# Patient Record
Sex: FEMALE | Race: WHITE | NOT HISPANIC OR LATINO | URBAN - METROPOLITAN AREA
[De-identification: names, ages, dates, MRNs, and addresses within clinical notes are randomized per-mention and may not be internally consistent; named-entity substitution may affect disease eponyms.]

---
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
## 2024-03-19 ENCOUNTER — OFFICE VISIT (OUTPATIENT)
Dept: FAMILY MEDICINE | Facility: CLINIC | Age: 19
End: 2024-03-19
Payer: COMMERCIAL

## 2024-03-19 ENCOUNTER — APPOINTMENT (OUTPATIENT)
Dept: LAB | Age: 19
End: 2024-03-19
Attending: NURSE PRACTITIONER
Payer: COMMERCIAL

## 2024-03-19 VITALS
RESPIRATION RATE: 16 BRPM | TEMPERATURE: 98.3 F | WEIGHT: 109 LBS | DIASTOLIC BLOOD PRESSURE: 60 MMHG | OXYGEN SATURATION: 97 % | BODY MASS INDEX: 20.58 KG/M2 | HEART RATE: 89 BPM | SYSTOLIC BLOOD PRESSURE: 102 MMHG | HEIGHT: 61 IN

## 2024-03-19 DIAGNOSIS — G43.909 MIGRAINE WITHOUT STATUS MIGRAINOSUS, NOT INTRACTABLE, UNSPECIFIED MIGRAINE TYPE: ICD-10-CM

## 2024-03-19 DIAGNOSIS — G43.909 MIGRAINE WITHOUT STATUS MIGRAINOSUS, NOT INTRACTABLE, UNSPECIFIED MIGRAINE TYPE: Primary | ICD-10-CM

## 2024-03-19 LAB
25(OH)D3 SERPL-MCNC: 23 NG/ML (ref 30–100)
ALBUMIN SERPL-MCNC: 4.1 G/DL (ref 3.5–5.7)
ALP SERPL-CCNC: 44 IU/L (ref 34–125)
ALT SERPL-CCNC: 15 IU/L (ref 7–52)
ANION GAP SERPL CALC-SCNC: 7 MEQ/L (ref 3–15)
AST SERPL-CCNC: 17 IU/L (ref 13–39)
BASOPHILS # BLD: 0.04 K/UL (ref 0.01–0.1)
BASOPHILS NFR BLD: 0.6 %
BILIRUB SERPL-MCNC: 0.3 MG/DL (ref 0.3–1.2)
BUN SERPL-MCNC: 8 MG/DL (ref 7–25)
CALCIUM SERPL-MCNC: 9.5 MG/DL (ref 8.6–10.3)
CHLORIDE SERPL-SCNC: 106 MEQ/L (ref 98–107)
CO2 SERPL-SCNC: 27 MEQ/L (ref 21–31)
CREAT SERPL-MCNC: 0.8 MG/DL (ref 0.6–1.2)
DIFFERENTIAL METHOD BLD: NORMAL
EGFRCR SERPLBLD CKD-EPI 2021: >60 ML/MIN/1.73M*2
EOSINOPHIL # BLD: 0.09 K/UL (ref 0.04–0.36)
EOSINOPHIL NFR BLD: 1.4 %
ERYTHROCYTE [DISTWIDTH] IN BLOOD BY AUTOMATED COUNT: 11.8 % (ref 11.7–14.4)
FERRITIN SERPL-MCNC: 24 NG/ML (ref 11–250)
GLUCOSE SERPL-MCNC: 97 MG/DL (ref 70–99)
HCT VFR BLD AUTO: 43.5 % (ref 35–45)
HGB BLD-MCNC: 14.3 G/DL (ref 11.8–15.7)
IMM GRANULOCYTES # BLD AUTO: 0.01 K/UL (ref 0–0.08)
IMM GRANULOCYTES NFR BLD AUTO: 0.2 %
IRON SATN MFR SERPL: 12 % (ref 15–45)
IRON SERPL-MCNC: 53 UG/DL (ref 35–150)
LYMPHOCYTES # BLD: 2.75 K/UL (ref 1.2–3.5)
LYMPHOCYTES NFR BLD: 43.2 %
MCH RBC QN AUTO: 29.7 PG (ref 28–33.2)
MCHC RBC AUTO-ENTMCNC: 32.9 G/DL (ref 32.2–35.5)
MCV RBC AUTO: 90.4 FL (ref 83–98)
MONOCYTES # BLD: 0.71 K/UL (ref 0.28–0.8)
MONOCYTES NFR BLD: 11.2 %
NEUTROPHILS # BLD: 2.76 K/UL (ref 1.7–7)
NEUTS SEG NFR BLD: 43.4 %
NRBC BLD-RTO: 0 %
PDW BLD AUTO: 10.9 FL (ref 9.4–12.3)
PLATELET # BLD AUTO: 264 K/UL (ref 150–369)
POTASSIUM SERPL-SCNC: 4.5 MEQ/L (ref 3.5–5.1)
PROT SERPL-MCNC: 6.7 G/DL (ref 6–8.2)
RBC # BLD AUTO: 4.81 M/UL (ref 3.93–5.22)
SODIUM SERPL-SCNC: 140 MEQ/L (ref 136–145)
TIBC SERPL-MCNC: 454 UG/DL (ref 270–460)
TSH SERPL DL<=0.05 MIU/L-ACNC: 0.48 MIU/L (ref 0.34–5.6)
UIBC SERPL-MCNC: 401 UG/DL (ref 180–360)
VIT B12 SERPL-MCNC: 196 PG/ML (ref 180–914)
WBC # BLD AUTO: 6.36 K/UL (ref 3.8–10.5)

## 2024-03-19 PROCEDURE — 82306 VITAMIN D 25 HYDROXY: CPT

## 2024-03-19 PROCEDURE — 36415 COLL VENOUS BLD VENIPUNCTURE: CPT

## 2024-03-19 PROCEDURE — 83550 IRON BINDING TEST: CPT

## 2024-03-19 PROCEDURE — 99213 OFFICE O/P EST LOW 20 MIN: CPT | Performed by: NURSE PRACTITIONER

## 2024-03-19 PROCEDURE — 84443 ASSAY THYROID STIM HORMONE: CPT

## 2024-03-19 PROCEDURE — 82040 ASSAY OF SERUM ALBUMIN: CPT

## 2024-03-19 PROCEDURE — 82728 ASSAY OF FERRITIN: CPT

## 2024-03-19 PROCEDURE — 82607 VITAMIN B-12: CPT

## 2024-03-19 PROCEDURE — 3008F BODY MASS INDEX DOCD: CPT | Performed by: NURSE PRACTITIONER

## 2024-03-19 PROCEDURE — 85025 COMPLETE CBC W/AUTO DIFF WBC: CPT

## 2024-03-19 ASSESSMENT — ENCOUNTER SYMPTOMS
HEADACHES: 1
PALPITATIONS: 0
DIZZINESS: 0
WEAKNESS: 0
NAUSEA: 0
SPEECH DIFFICULTY: 0
PHOTOPHOBIA: 1

## 2024-03-19 NOTE — ASSESSMENT & PLAN NOTE
Discussed while not an absolute contraindication, may want to discuss with GYN switching to POP  She declined triptan, wants to try excedrin first  She would be a candidate for preventive if needed, but will first see if we can narrow down some actionable triggers  Gave list of common triggers and HA diary for completion   Consider starting migravent supplement, or mag and b2 if that is cost prohibitive  Lab slip issued to further assess  If this continues to be an issue may want to consider brain MRI as baseline  F/u if no better/worse

## 2024-03-19 NOTE — PROGRESS NOTES
Reason for visit:   Chief Complaint   Patient presents with   • Follow-up     Ongoing Migraines, wonders if it is from birth control       HPI  is a 19 y.o. female     HPI    Here to discuss migraines.     Started in the last year. Mom has hx migraines.     About 3 mo ago was getting frequently, maybe every other day.     In the last month reduced to 1-2x/week.     Pain usually behind left eye, but could be behind either. + light sensitive. Feels like she has to go to bed. No nausea or sound sensitivity.     She doesn't think any auras.     Often wakes up with them, last about a day at a time. Sometimes they spill into the next day.     Saw optho over the summer, got glasses. Supposed to wear when sitting far in class but typically doesn't.     Takes ibuprofen which helps but doesn't usually eliminate them. Tries cooling cap.     No visual disturbance or dizziness when they occur.     Problem List:  Patient Active Problem List    Diagnosis Date Noted   • Migraine without status migrainosus, not intractable 03/19/2024   • Acute swimmer's ear of right side 07/18/2023   • Mass of postauricular area 07/18/2023   • Diarrhea 07/18/2023       Surgical History:  Past Surgical History:   Procedure Laterality Date   • WISDOM TOOTH EXTRACTION         Social History:  Social History     Social History Narrative   • Not on file       Family History:  Family History   Problem Relation Age of Onset   • Lupus Biological Mother        Allergies:  Patient has no known allergies.    Current Medications:  Current Outpatient Medications   Medication Sig Dispense Refill   • levocetirizine (XYZAL) 5 mg tablet Take 5 mg by mouth every evening.     • VESTURA, 28, 3-0.02 mg per tablet TAKE 1 TABLET BY MOUTH EVERY DAY FOR 84 DAYS       No current facility-administered medications for this visit.         Review of Systems:  Review of Systems   HENT: Negative for hearing loss.    Eyes: Positive for photophobia. Negative for visual  disturbance.   Cardiovascular: Negative for chest pain and palpitations.   Gastrointestinal: Negative for nausea.   Neurological: Positive for headaches. Negative for dizziness, syncope, speech difficulty and weakness.       Objective     Vital Signs:  Vitals:    03/19/24 1402   BP: 102/60   Pulse: 89   Resp: 16   Temp: 36.8 °C (98.3 °F)   SpO2: 97%       BMI:  Body mass index is 20.77 kg/m².     Physical Exam  Constitutional:       Appearance: Normal appearance.   Eyes:      Extraocular Movements: Extraocular movements intact.      Pupils: Pupils are equal, round, and reactive to light.   Cardiovascular:      Rate and Rhythm: Normal rate and regular rhythm.   Pulmonary:      Effort: Pulmonary effort is normal.      Breath sounds: Normal breath sounds.   Neurological:      Mental Status: She is alert and oriented to person, place, and time.         Recent labs before today:     Lab Results   Component Value Date    CHOL 198 08/14/2023    TRIG 91 08/14/2023    HDL 73 08/14/2023    ALT 18 08/14/2023    AST 21 08/14/2023     08/14/2023    K 4.5 08/14/2023     08/14/2023    CREATININE 0.7 08/14/2023    BUN 6 (L) 08/14/2023    CO2 29 08/14/2023       There are no Patient Instructions on file for this visit.  If you do not hear from me regarding results in 7-10 days either via a call or the portal please call.      Problem List Items Addressed This Visit        Other    Migraine without status migrainosus, not intractable - Primary     Discussed while not an absolute contraindication, may want to discuss with GYN switching to POP  She declined triptan, wants to try excedrin first  She would be a candidate for preventive if needed, but will first see if we can narrow down some actionable triggers  Gave list of common triggers and HA diary for completion   Consider starting migravent supplement, or mag and b2 if that is cost prohibitive  Lab slip issued to further assess  If this continues to be an issue may  want to consider brain MRI as baseline  F/u if no better/worse         Relevant Orders    CBC and differential    Comprehensive metabolic panel    Vitamin B12    TSH w reflex FT4    Iron and TIBC    Ferritin    Vitamin D 25 hydroxy            KRISTINA Boone  3/19/2024

## 2024-03-21 ENCOUNTER — TELEPHONE (OUTPATIENT)
Dept: FAMILY MEDICINE | Facility: CLINIC | Age: 19
End: 2024-03-21
Payer: COMMERCIAL

## 2024-03-21 NOTE — LETTER
March 21, 2024     Patient: Teresa Celaya   YOB: 2005   Date of Visit: 3/21/2024       To Whom it May Concern:    Teresa Celaya was seen in my clinic on 3/19/24. Please excuse her from class time missed in order to make this appointment.     If you have any questions or concerns, please don't hesitate to call.         Sincerely,          KRISTINA Boone        CC: No Recipients

## 2024-03-21 NOTE — TELEPHONE ENCOUNTER
Patient's mother left a voicemail. Patient seen Malia on 3/19. She stated patient went back to school 3/20 and she needs a doctors note for missing class. She asked if the note can be emailed to her at [email or web address]    Delmi can be reached at [phone].

## 2024-03-25 ENCOUNTER — TELEPHONE (OUTPATIENT)
Dept: FAMILY MEDICINE | Facility: CLINIC | Age: 19
End: 2024-03-25
Payer: COMMERCIAL

## 2024-06-05 ENCOUNTER — TELEPHONE (OUTPATIENT)
Dept: FAMILY MEDICINE | Facility: CLINIC | Age: 19
End: 2024-06-05
Payer: COMMERCIAL

## 2024-06-05 NOTE — TELEPHONE ENCOUNTER
Patient called stating she needs a refill on Vestura. Stated she is currently in the process of changing GYN doctors. Stated her appointment with the new GYN is not for another month and asked if Dr. Alva can send in a month supply to her pharmacy. Pls advise.     Pharmacy is [facility] on [address]

## 2024-06-06 RX ORDER — DROSPIRENONE AND ETHINYL ESTRADIOL 0.02-3(28)
KIT ORAL
Qty: 84 TABLET | Refills: 0 | Status: SHIPPED | OUTPATIENT
Start: 2024-06-06